# Patient Record
Sex: MALE | Race: AMERICAN INDIAN OR ALASKA NATIVE | Employment: OTHER | URBAN - METROPOLITAN AREA
[De-identification: names, ages, dates, MRNs, and addresses within clinical notes are randomized per-mention and may not be internally consistent; named-entity substitution may affect disease eponyms.]

---

## 2020-02-10 ENCOUNTER — OFFICE VISIT (OUTPATIENT)
Dept: SURGERY | Facility: CLINIC | Age: 35
End: 2020-02-10
Payer: COMMERCIAL

## 2020-02-10 VITALS
HEIGHT: 70.5 IN | WEIGHT: 174 LBS | BODY MASS INDEX: 24.63 KG/M2 | HEART RATE: 89 BPM | SYSTOLIC BLOOD PRESSURE: 126 MMHG | DIASTOLIC BLOOD PRESSURE: 90 MMHG

## 2020-02-10 DIAGNOSIS — N35.912 STRICTURE OF BULBOUS URETHRA IN MALE, UNSPECIFIED STRICTURE TYPE: Primary | ICD-10-CM

## 2020-02-10 PROCEDURE — 52281 CYSTOSCOPY AND TREATMENT: CPT | Performed by: UROLOGY

## 2020-02-10 PROCEDURE — 99212 OFFICE O/P EST SF 10 MIN: CPT | Performed by: NURSE PRACTITIONER

## 2020-02-10 PROCEDURE — 99203 OFFICE O/P NEW LOW 30 MIN: CPT | Performed by: NURSE PRACTITIONER

## 2020-02-10 RX ORDER — SULFAMETHOXAZOLE AND TRIMETHOPRIM 800; 160 MG/1; MG/1
1 TABLET ORAL 2 TIMES DAILY
Qty: 14 TABLET | Refills: 0 | Status: SHIPPED | OUTPATIENT
Start: 2020-02-10 | End: 2020-02-17

## 2020-02-10 NOTE — PROGRESS NOTES
HPI:    Patient ID: Paola Bertrand is a 37year old male. HPI     Patient is a 37year old male who presents to the clinic for a consult regarding difficulty with urination. No significant past medical history.      Patient states 1 week ago he wa Smoking status: Not on file    Alcohol use: Not on file    Drug use: Not on file       PHYSICAL EXAM:    Physical Exam   Constitutional: He is oriented to person, place, and time. He appears well-nourished. No distress. HENT:   Head: Normocephalic.    Ey being met in the bulbar urethra. I attempted to place a 12 Western Anny latex catheter using sterile technique again without success with resistance being met at the level of the bulbar urethra.     Patient was counseled regarding his management options which in up a consultation appointment as soon as possible with Dr. Padmini Akers at [facility] for further evaluation management of his recurrent bulbar urethral stricture. A 7-day course of Bactrim DS will be prescribed to his pharmacy on record.     All

## 2020-02-11 ENCOUNTER — TELEPHONE (OUTPATIENT)
Dept: SURGERY | Facility: CLINIC | Age: 35
End: 2020-02-11

## 2020-02-11 NOTE — TELEPHONE ENCOUNTER
Patient was seen on 2/10/2020 for this procedure in office. Called [facility] at [phone] and spoke with Julianne. No PA required for office based or outpatient cysto CPT 78331 on this plan.  Call reference Bernie Roy 2/11/2020

## 2020-02-11 NOTE — TELEPHONE ENCOUNTER
Pt has appt tomorrow at [facility] with Dr Claudette Burns- phone [phone] - asking for OV notes and any other info , results from yesterday to be faxed to [phone]

## 2020-11-11 NOTE — H&P
Dutch Alvarez is a 38year old male. HPI:   Patient presents with:  Establish Care: Former PCP Dr. Corbin Mcardle. Last ov over 2.5 years. Surgical/procedure Clearance: Has surgery scheduled for 12/7 for Urethraplasty at [facility].  Previously done in 19 Father    • Cancer Maternal Grandfather    • Diabetes Paternal Grandmother       Social History: Social History    Tobacco Use      Smoking status: Never Smoker      Smokeless tobacco: Never Used    Alcohol use: Yes      Frequency: 2-4 times a month      D No acute distress. Alert and oriented x 3. Eyes: EOMI, PERRLA, clear sclera b/l  HENT: NCAT, Moist mucous membranes, Oropharynx without erythema or exudates  Cardiovascular: S1, S2, no S3, no S4, Regular rate and rhythm, No murmurs/gallops/rubs.    Timothy Rincon baseline. He is actively trying to reduce his weight. Does not have any concerns for chest pain, shortness of breath, lower extremity swelling, dyspnea on exertion. Examination unremarkable as above.     He has an RCRI risk score of 0 corresponding to a indicated    11/12/2020  Agatha Cui MD    11/13/2020 Addendum:  Reviewed preoperative data:  - BMP: largely unremarkable - would recommend adequate hydration  - PT/INR, PTT WNL  - CBC WNL  - UA: mild protein, otherwise unremarkable  - UCx: negative    O

## 2020-11-12 ENCOUNTER — OFFICE VISIT (OUTPATIENT)
Dept: INTERNAL MEDICINE CLINIC | Facility: CLINIC | Age: 35
End: 2020-11-12
Payer: COMMERCIAL

## 2020-11-12 ENCOUNTER — LAB ENCOUNTER (OUTPATIENT)
Dept: LAB | Age: 35
End: 2020-11-12
Attending: INTERNAL MEDICINE
Payer: COMMERCIAL

## 2020-11-12 VITALS
OXYGEN SATURATION: 99 % | DIASTOLIC BLOOD PRESSURE: 80 MMHG | HEART RATE: 72 BPM | BODY MASS INDEX: 25.37 KG/M2 | WEIGHT: 177.19 LBS | HEIGHT: 70 IN | SYSTOLIC BLOOD PRESSURE: 122 MMHG | TEMPERATURE: 98 F

## 2020-11-12 DIAGNOSIS — Z13.0 SCREENING FOR DEFICIENCY ANEMIA: ICD-10-CM

## 2020-11-12 DIAGNOSIS — Z01.818 PREOPERATIVE CLEARANCE: ICD-10-CM

## 2020-11-12 DIAGNOSIS — Z00.00 WELLNESS EXAMINATION: ICD-10-CM

## 2020-11-12 DIAGNOSIS — N35.912 STRICTURE OF BULBOUS URETHRA IN MALE, UNSPECIFIED STRICTURE TYPE: ICD-10-CM

## 2020-11-12 DIAGNOSIS — Z00.00 WELLNESS EXAMINATION: Primary | ICD-10-CM

## 2020-11-12 PROCEDURE — 87086 URINE CULTURE/COLONY COUNT: CPT

## 2020-11-12 PROCEDURE — 3074F SYST BP LT 130 MM HG: CPT | Performed by: INTERNAL MEDICINE

## 2020-11-12 PROCEDURE — 85025 COMPLETE CBC W/AUTO DIFF WBC: CPT

## 2020-11-12 PROCEDURE — 3008F BODY MASS INDEX DOCD: CPT | Performed by: INTERNAL MEDICINE

## 2020-11-12 PROCEDURE — 85730 THROMBOPLASTIN TIME PARTIAL: CPT

## 2020-11-12 PROCEDURE — 36415 COLL VENOUS BLD VENIPUNCTURE: CPT

## 2020-11-12 PROCEDURE — 80048 BASIC METABOLIC PNL TOTAL CA: CPT

## 2020-11-12 PROCEDURE — 3079F DIAST BP 80-89 MM HG: CPT | Performed by: INTERNAL MEDICINE

## 2020-11-12 PROCEDURE — 99385 PREV VISIT NEW AGE 18-39: CPT | Performed by: INTERNAL MEDICINE

## 2020-11-12 PROCEDURE — 81001 URINALYSIS AUTO W/SCOPE: CPT

## 2020-11-12 PROCEDURE — 85610 PROTHROMBIN TIME: CPT

## 2020-11-12 NOTE — PATIENT INSTRUCTIONS
You were seen in clinic for clearance prior to surgery. Your most recent and no further testing is required for the heart. Overall your remain in excellent health and we will obtain the lab testing as recommended by Dr. Pantoja Burn office.   Based on these

## 2020-11-14 ENCOUNTER — TELEPHONE (OUTPATIENT)
Dept: INTERNAL MEDICINE CLINIC | Facility: CLINIC | Age: 35
End: 2020-11-14

## 2020-11-14 NOTE — TELEPHONE ENCOUNTER
Please kindly notify the patient that I have reviewed his preoperative blood work from 11/12/2020    BMP: May be a little bit dehydrated, but otherwise within normal limits  His coagulation factors (PT/INR, PTT): Normal  CBC: Blood counts are all normal, n

## 2020-11-16 NOTE — TELEPHONE ENCOUNTER
LMTCB-11/12 OV note and labs faxed to Dr. Savannah Wright at [phone], confirmation received. Original paperwork sent to scan.

## 2020-11-18 ENCOUNTER — TELEPHONE (OUTPATIENT)
Dept: INTERNAL MEDICINE CLINIC | Facility: CLINIC | Age: 35
End: 2020-11-18

## 2020-11-18 NOTE — TELEPHONE ENCOUNTER
Dr. Azeem Tran from [facility] called to get this pt's UA result. Please fax  It to him at [phone]. Dr. Azeem Tran can be reached at [phone]  Option 2.

## 2020-12-25 ENCOUNTER — HOSPITAL ENCOUNTER (EMERGENCY)
Facility: HOSPITAL | Age: 35
Discharge: HOME | End: 2020-12-25
Attending: EMERGENCY MEDICINE
Payer: COMMERCIAL

## 2020-12-25 VITALS
HEIGHT: 70 IN | WEIGHT: 175 LBS | SYSTOLIC BLOOD PRESSURE: 151 MMHG | HEART RATE: 100 BPM | BODY MASS INDEX: 25.05 KG/M2 | DIASTOLIC BLOOD PRESSURE: 86 MMHG | OXYGEN SATURATION: 100 % | TEMPERATURE: 97 F | RESPIRATION RATE: 18 BRPM

## 2020-12-25 DIAGNOSIS — T83.091A OBSTRUCTION OF FOLEY CATHETER, INITIAL ENCOUNTER (HCC): Primary | ICD-10-CM

## 2020-12-25 PROCEDURE — 99283 EMERGENCY DEPT VISIT LOW MDM: CPT

## 2020-12-25 NOTE — ED PROVIDER NOTES
Patient Seen in: [facility] AND [facility] Emergency Department      History   Patient presents with:  Cath Tube Problem    Stated Complaint: Cath issue    HPI  47-year-old male with history of urethral stricture status post urethroplasty on December 7 and place Constitutional:       Appearance: He is well-developed. HENT:      Head: Normocephalic and atraumatic. Neck:      Musculoskeletal: Normal range of motion. Cardiovascular:      Rate and Rhythm: Normal rate and regular rhythm.       Heart sounds: Norm

## 2020-12-25 NOTE — ED INITIAL ASSESSMENT (HPI)
Patient reports to ER reporting having a urethroplasty on December 7th at [address] [place] [address], sent home with a leg bag- which is supposed to come out on Tuesday, but states that it did not empty this morning. Denies pain. Very anxious in triage.

## 2021-04-20 ENCOUNTER — OFFICE VISIT (OUTPATIENT)
Dept: INTERNAL MEDICINE CLINIC | Facility: CLINIC | Age: 36
End: 2021-04-20
Payer: COMMERCIAL

## 2021-04-20 VITALS
OXYGEN SATURATION: 99 % | DIASTOLIC BLOOD PRESSURE: 74 MMHG | HEART RATE: 83 BPM | TEMPERATURE: 98 F | WEIGHT: 178.38 LBS | HEIGHT: 70 IN | BODY MASS INDEX: 25.54 KG/M2 | SYSTOLIC BLOOD PRESSURE: 110 MMHG

## 2021-04-20 DIAGNOSIS — M25.521 RIGHT ELBOW PAIN: ICD-10-CM

## 2021-04-20 DIAGNOSIS — M25.462 EFFUSION OF LEFT KNEE: Primary | ICD-10-CM

## 2021-04-20 PROCEDURE — 3078F DIAST BP <80 MM HG: CPT | Performed by: INTERNAL MEDICINE

## 2021-04-20 PROCEDURE — 99214 OFFICE O/P EST MOD 30 MIN: CPT | Performed by: INTERNAL MEDICINE

## 2021-04-20 PROCEDURE — 3074F SYST BP LT 130 MM HG: CPT | Performed by: INTERNAL MEDICINE

## 2021-04-20 PROCEDURE — 3008F BODY MASS INDEX DOCD: CPT | Performed by: INTERNAL MEDICINE

## 2021-04-20 RX ORDER — FLUTICASONE PROPIONATE 50 MCG
2 SPRAY, SUSPENSION (ML) NASAL DAILY PRN
COMMUNITY

## 2021-04-20 NOTE — PROGRESS NOTES
Marlena Moreno is a 38year old male. HPI:   He is here for joint pain.      3 weeks ago he was playing basketball and after that he had pain in the left knee that lasted for 4 days, 1 week ago he played basketball again and had pain in the right elb (36.8 °C)   Ht 5' 10\" (1.778 m)   Wt 178 lb 6.4 oz (80.9 kg)   SpO2 99%   BMI 25.60 kg/m²   GENERAL: well developed, well nourished,in no apparent distress  SKIN: no rashes,no suspicious lesions  HEENT: atraumatic, normocephalic,ears and throat are clear

## 2021-05-24 ENCOUNTER — OFFICE VISIT (OUTPATIENT)
Dept: INTERNAL MEDICINE CLINIC | Facility: CLINIC | Age: 36
End: 2021-05-24
Payer: COMMERCIAL

## 2021-05-24 VITALS
HEIGHT: 70 IN | SYSTOLIC BLOOD PRESSURE: 128 MMHG | OXYGEN SATURATION: 99 % | WEIGHT: 176.19 LBS | BODY MASS INDEX: 25.22 KG/M2 | HEART RATE: 66 BPM | DIASTOLIC BLOOD PRESSURE: 80 MMHG | TEMPERATURE: 98 F

## 2021-05-24 DIAGNOSIS — M25.521 RIGHT ELBOW PAIN: Primary | ICD-10-CM

## 2021-05-24 DIAGNOSIS — M25.462 EFFUSION OF LEFT KNEE: ICD-10-CM

## 2021-05-24 DIAGNOSIS — M70.21 OLECRANON BURSITIS OF RIGHT ELBOW: ICD-10-CM

## 2021-05-24 PROCEDURE — 99214 OFFICE O/P EST MOD 30 MIN: CPT | Performed by: INTERNAL MEDICINE

## 2021-05-24 PROCEDURE — 3079F DIAST BP 80-89 MM HG: CPT | Performed by: INTERNAL MEDICINE

## 2021-05-24 PROCEDURE — 3008F BODY MASS INDEX DOCD: CPT | Performed by: INTERNAL MEDICINE

## 2021-05-24 PROCEDURE — 3074F SYST BP LT 130 MM HG: CPT | Performed by: INTERNAL MEDICINE

## 2021-05-24 NOTE — PATIENT INSTRUCTIONS
You were seen in clinic for follow-up of your right elbow pain. This is likely due to olecranon bursitis as result of local trauma to that area.   Our first step of management is conservative therapy:    - Icing and compressing the area to facilitate impro

## 2021-05-24 NOTE — PROGRESS NOTES
Chief Complaint:   Patient presents with:  Checkup: right elbow pain, pain subsided, hit arm this week and noticed fluid in elbow. Iced today and yesterday.      HPI:     Mr. Yahir Solis is a 43year old male past medical history of urethral stricture requiring in Alcohol use: Yes      Comment: occasional    Drug use: Never    Family History:  Family History   Problem Relation Age of Onset   • Diabetes Father    • Cancer Maternal Grandfather    • Diabetes Paternal Grandmother      Allergies:    Seasonal Constitutional: No acute distress. Alert and oriented x 3.   Eyes: EOMI, PERRLA, clear sclera b/l  HENT: NCAT, Moist mucous membranes, Oropharynx without erythema or exudates  Neck: No JVD, no thyromegaly  Cardiovascular: S1, S2, no S3, no S4, Regular rat olecranon bursitis. –If needed, can consider aspiration in 2 to 3 weeks. In the acute setting, there is no indication to do so at this time as this is likely traumatic electron bursitis rather than septic joint or gout.   Patient has no restrictions at th

## 2021-09-06 ENCOUNTER — HOSPITAL ENCOUNTER (OUTPATIENT)
Age: 36
Discharge: HOME | End: 2021-09-06
Payer: COMMERCIAL

## 2021-09-06 VITALS
DIASTOLIC BLOOD PRESSURE: 93 MMHG | RESPIRATION RATE: 20 BRPM | WEIGHT: 170 LBS | HEIGHT: 70 IN | TEMPERATURE: 98 F | BODY MASS INDEX: 24.34 KG/M2 | OXYGEN SATURATION: 100 % | HEART RATE: 88 BPM | SYSTOLIC BLOOD PRESSURE: 124 MMHG

## 2021-09-06 DIAGNOSIS — T63.441A BEE STING, ACCIDENTAL OR UNINTENTIONAL, INITIAL ENCOUNTER: Primary | ICD-10-CM

## 2021-09-06 PROCEDURE — 99203 OFFICE O/P NEW LOW 30 MIN: CPT | Performed by: PHYSICIAN ASSISTANT

## 2021-09-06 NOTE — ED PROVIDER NOTES
Patient Seen in: [facility]      History   Patient presents with:  Bite Sting,Insect    Stated Complaint: insect bite    HPI/Subjective:   HPI    44 yo male here for evaluation of bee sting.   Pt states he was stung in the R posterior thigh ye is alert and oriented to person, place, and time.    Psychiatric:         Mood and Affect: Mood normal.         Behavior: Behavior normal.         ED Course   Labs Reviewed - No data to display      42 yo male here for evaluation of bee sting to the r poste

## 2022-11-15 ENCOUNTER — TELEPHONE (OUTPATIENT)
Dept: INTERNAL MEDICINE CLINIC | Facility: CLINIC | Age: 37
End: 2022-11-15

## 2022-11-15 NOTE — TELEPHONE ENCOUNTER
Patient is calling states he is going out of the country to [place] around 11/25/22. Patient is asking if there is anything medically he needs to do or take.     Please call and advise

## 2022-11-16 NOTE — TELEPHONE ENCOUNTER
Patient is calling and chose to leave a message on the [employer] answering machine. Patient states he called yesterday and is still waiting for a call back. Hoping for a call back today.       # [phone]

## 2022-11-16 NOTE — TELEPHONE ENCOUNTER
CDC recommends Staying up todate on  - Varicella (chickenpox) - OK if he had Chickenpox growing up or received childhood vaccinations  - Tdap - up to date  - Flu  - MMR - OK if had childhood vaccinations  - Polio- OK if had childhood vaccinations  - COVID  Recommended  - Hepatitis A (Not mandatory)  - Typhoid (only if staying in rural areas)    If there is insufficient time to obtain any of these vaccines, OK to maintain standard precautions (minimize bug bites, avoid/minimize sick contacts, monitoring for symptoms etc)

## 2022-11-29 ENCOUNTER — TELEPHONE (OUTPATIENT)
Dept: INTERNAL MEDICINE CLINIC | Facility: CLINIC | Age: 37
End: 2022-11-29

## 2022-11-29 NOTE — TELEPHONE ENCOUNTER
Received request from [address] [place] [address] for medical records as high priority. Faxed to scan stat as high priority.

## 2023-01-18 ENCOUNTER — OFFICE VISIT (OUTPATIENT)
Dept: INTERNAL MEDICINE CLINIC | Facility: CLINIC | Age: 38
End: 2023-01-18

## 2023-01-18 VITALS
SYSTOLIC BLOOD PRESSURE: 140 MMHG | HEART RATE: 76 BPM | WEIGHT: 179 LBS | HEIGHT: 70 IN | DIASTOLIC BLOOD PRESSURE: 80 MMHG | BODY MASS INDEX: 25.62 KG/M2 | OXYGEN SATURATION: 98 %

## 2023-01-18 DIAGNOSIS — R10.32 LLQ ABDOMINAL PAIN: Primary | ICD-10-CM

## 2023-01-18 PROCEDURE — 99213 OFFICE O/P EST LOW 20 MIN: CPT | Performed by: INTERNAL MEDICINE

## 2023-01-18 PROCEDURE — 3077F SYST BP >= 140 MM HG: CPT | Performed by: INTERNAL MEDICINE

## 2023-01-18 PROCEDURE — 3079F DIAST BP 80-89 MM HG: CPT | Performed by: INTERNAL MEDICINE

## 2023-01-18 PROCEDURE — 3008F BODY MASS INDEX DOCD: CPT | Performed by: INTERNAL MEDICINE

## 2023-01-18 RX ORDER — FINASTERIDE 1 MG/1
1 TABLET, FILM COATED ORAL DAILY
COMMUNITY
Start: 2022-08-19

## 2023-05-11 NOTE — PATIENT INSTRUCTIONS
- You were seen in clinic for regular annual check-up. We have ordered labs for you and we will call you with the results. Please obtain the bloodwork fasting for 12 hours. OK to drink water the day of your blood draw. We have the lab downstairs on the first floor. No appointment is necessary. Lab hours are Monday-Friday 7:30 AM to 4:45 PM.  There may be Saturday hours 7 AM-11:00 AM as well. Otherwise you can obtain the blood tests on the weekends at the main hospital or local immediate care/urgent care within the [facility]. -Today we will focus on management of difficulty with concentration and also weight management. We should rule out secondary causes such as thyroid disease. Please obtain fasting blood work    We did discuss the use of Adderall for difficulties with concentration standing even during grade school-middle school times. Thankfully, you have had great success despite the symptoms, however lets try to see if we get this under better control for you. We will start Adderall 10 mg once a day, and aim for the lowest effective dose of stimulant medication.  -Call or send a [employer] message 3 weeks after starting the medication. We want to ensure that we are on a good dosage with minimal side effects. We can consider a dose adjustment or switching to a different medication. We did talk about stimulant medication and We discussed the potential side effects of long-term stimulant use which include high blood pressure, chest pain, palpitations, unintended weight loss due to poor appetite, insomnia, increased anxiety. We should do periodic check ins to make sure that it is still safe to continue stimulant medication. The goal is to control symptoms while minimizing side effects with the lowest effective dose and the shortest duration of time. - In terms of weight management, remain in a healthy BMI level.   He may get added benefit from the appetite suppression from Adderall/stimulant therapy. This can result in weight loss, but monitor for excessive weight loss over time. You are doing well in terms of nutrition and exercise and should continue this as the primary foundation of weight management    - May have ongoing seasonal allergies. We do recommend:    - Trial of flonase 1 spray each nostril until symptoms improve. Use for at least 2 weeks even if symptoms improve  - Trial of antihistamine  Zyrtec/Allegra/Xyzal/Claritin once a day even if symptoms improve. You may need a baseline antihistamine regimen to control her symptoms. If you notice no improvement on 1 formulation, consider switching to another formulation.  - If still no improvement, may benefit from Singulair which is a medication we can consider after we are on a stable regimen of Adderall. In terms of your stomach symptoms, this may be related to a mild form of irritable bowel syndrome. You may have a degree of lactose intolerance. We recommended:  - Look for any other food triggers. A low FODMAP diet for the majority of the week may help improve your symptoms over time  - As we have some elements of constipation, consider using either a probiotic or stool softener (MiraLAX/Metamucil) once a day. This can help improve your bowel habits and may improve the abdominal symptoms you experience  - If no improvement, we can consider medications dedicated for IBS in the future.      -Please continue checking your blood pressures at home and notify us if they are increasing   - Please continue following up with urology, Dr. Kari Spears on an as-needed basis. - Please continue to eat a varied diet including recommended servings of vegetables, fruits, and low fat dairy. Minimize high saturated fats (such as fast foods) and high sugar intake (such as soda)  - We recommend 150 minutes of moderate intensity exercise (brisk walking, swimming) weekly to maintain your current weight.   Targeted weight loss will require more vigorous exercise or more than 150 minutes/week.     Return to clinic in 6 months for follow-up

## 2023-05-12 ENCOUNTER — OFFICE VISIT (OUTPATIENT)
Dept: INTERNAL MEDICINE CLINIC | Facility: CLINIC | Age: 38
End: 2023-05-12

## 2023-05-12 VITALS
HEART RATE: 79 BPM | OXYGEN SATURATION: 99 % | HEIGHT: 70 IN | SYSTOLIC BLOOD PRESSURE: 132 MMHG | DIASTOLIC BLOOD PRESSURE: 88 MMHG | TEMPERATURE: 98 F | BODY MASS INDEX: 24.82 KG/M2 | WEIGHT: 173.38 LBS

## 2023-05-12 DIAGNOSIS — Z00.00 ANNUAL PHYSICAL EXAM: Primary | ICD-10-CM

## 2023-05-12 DIAGNOSIS — Z13.0 SCREENING FOR DEFICIENCY ANEMIA: ICD-10-CM

## 2023-05-12 DIAGNOSIS — Z13.220 SCREENING FOR LIPOID DISORDERS: ICD-10-CM

## 2023-05-12 DIAGNOSIS — Z13.1 SCREENING FOR DIABETES MELLITUS: ICD-10-CM

## 2023-05-12 DIAGNOSIS — Z13.29 SCREENING FOR THYROID DISORDER: ICD-10-CM

## 2023-05-12 DIAGNOSIS — E55.9 VITAMIN D DEFICIENCY: ICD-10-CM

## 2023-05-12 DIAGNOSIS — R41.840 CONCENTRATION DEFICIT: ICD-10-CM

## 2023-05-12 PROCEDURE — 99395 PREV VISIT EST AGE 18-39: CPT | Performed by: INTERNAL MEDICINE

## 2023-05-12 PROCEDURE — 3079F DIAST BP 80-89 MM HG: CPT | Performed by: INTERNAL MEDICINE

## 2023-05-12 PROCEDURE — 3008F BODY MASS INDEX DOCD: CPT | Performed by: INTERNAL MEDICINE

## 2023-05-12 PROCEDURE — 3075F SYST BP GE 130 - 139MM HG: CPT | Performed by: INTERNAL MEDICINE

## 2023-05-12 RX ORDER — DEXTROAMPHETAMINE SACCHARATE, AMPHETAMINE ASPARTATE, DEXTROAMPHETAMINE SULFATE AND AMPHETAMINE SULFATE 2.5; 2.5; 2.5; 2.5 MG/1; MG/1; MG/1; MG/1
10 TABLET ORAL DAILY
Qty: 30 TABLET | Refills: 0 | Status: SHIPPED | OUTPATIENT
Start: 2023-05-12

## 2023-05-12 RX ORDER — FLUTICASONE PROPIONATE 50 MCG
1 SPRAY, SUSPENSION (ML) NASAL DAILY
Qty: 18.2 ML | Refills: 1 | Status: SHIPPED | OUTPATIENT
Start: 2023-05-12 | End: 2024-05-06

## 2023-05-30 ENCOUNTER — TELEPHONE (OUTPATIENT)
Dept: INTERNAL MEDICINE CLINIC | Facility: CLINIC | Age: 38
End: 2023-05-30

## 2023-05-30 RX ORDER — DEXTROAMPHETAMINE SACCHARATE, AMPHETAMINE ASPARTATE, DEXTROAMPHETAMINE SULFATE AND AMPHETAMINE SULFATE 2.5; 2.5; 2.5; 2.5 MG/1; MG/1; MG/1; MG/1
10 TABLET ORAL DAILY
Qty: 30 TABLET | Refills: 0 | Status: CANCELLED | OUTPATIENT
Start: 2023-05-30

## 2023-05-31 RX ORDER — DEXTROAMPHETAMINE SACCHARATE, AMPHETAMINE ASPARTATE, DEXTROAMPHETAMINE SULFATE AND AMPHETAMINE SULFATE 2.5; 2.5; 2.5; 2.5 MG/1; MG/1; MG/1; MG/1
10 TABLET ORAL DAILY
Qty: 30 TABLET | Refills: 0 | Status: SHIPPED | OUTPATIENT
Start: 2023-07-13 | End: 2023-08-12

## 2023-05-31 RX ORDER — FLUTICASONE PROPIONATE 50 MCG
1 SPRAY, SUSPENSION (ML) NASAL DAILY
Qty: 18.2 ML | Refills: 1 | Status: SHIPPED | OUTPATIENT
Start: 2023-05-31 | End: 2024-05-25

## 2023-05-31 RX ORDER — DEXTROAMPHETAMINE SACCHARATE, AMPHETAMINE ASPARTATE, DEXTROAMPHETAMINE SULFATE AND AMPHETAMINE SULFATE 2.5; 2.5; 2.5; 2.5 MG/1; MG/1; MG/1; MG/1
10 TABLET ORAL DAILY
Qty: 30 TABLET | Refills: 0 | Status: SHIPPED | OUTPATIENT
Start: 2023-06-12 | End: 2023-07-12

## 2023-05-31 RX ORDER — DEXTROAMPHETAMINE SACCHARATE, AMPHETAMINE ASPARTATE, DEXTROAMPHETAMINE SULFATE AND AMPHETAMINE SULFATE 2.5; 2.5; 2.5; 2.5 MG/1; MG/1; MG/1; MG/1
10 TABLET ORAL DAILY
Qty: 30 TABLET | Refills: 0 | Status: SHIPPED | OUTPATIENT
Start: 2023-08-13 | End: 2023-09-12

## 2023-05-31 NOTE — TELEPHONE ENCOUNTER
To MD:  The above refill request is for a controlled substance. Please review pended medication order. Print and sign for staff to fax to pharmacy or prescribe electronically.     Last office visit: 5/12/23    Last time refill sent and quantity/refills:     Dispensed Written Strength Quantity Refills Days Supply Provider Pharmacy   AMPHETAMINE SALT COMBO 05/12/2023 05/12/2023 10 mg 30 tablet  30 JOSE MIGUEL;[place];[id number]; [facility]

## 2023-06-11 ENCOUNTER — PATIENT MESSAGE (OUTPATIENT)
Dept: INTERNAL MEDICINE CLINIC | Facility: CLINIC | Age: 38
End: 2023-06-11

## 2023-06-30 ENCOUNTER — PATIENT MESSAGE (OUTPATIENT)
Dept: INTERNAL MEDICINE CLINIC | Facility: CLINIC | Age: 38
End: 2023-06-30

## 2023-06-30 NOTE — TELEPHONE ENCOUNTER
To DR. ROSALES - looks like patient still has refill on Adderall for August til September. and July through August.  Also has question about Ozempic

## 2023-07-10 ENCOUNTER — TELEPHONE (OUTPATIENT)
Dept: INTERNAL MEDICINE CLINIC | Facility: CLINIC | Age: 38
End: 2023-07-10

## 2023-07-10 RX ORDER — DEXTROAMPHETAMINE SACCHARATE, AMPHETAMINE ASPARTATE, DEXTROAMPHETAMINE SULFATE AND AMPHETAMINE SULFATE 2.5; 2.5; 2.5; 2.5 MG/1; MG/1; MG/1; MG/1
10 TABLET ORAL DAILY
Qty: 30 TABLET | Refills: 0 | Status: CANCELLED | OUTPATIENT
Start: 2023-07-10 | End: 2023-08-09

## 2023-07-11 NOTE — TELEPHONE ENCOUNTER
[facility] called to get permission to release the Adderall refill 1 day early. His  date is tomorrow, but Pt. Is going out of town tomorrow.

## 2023-07-11 NOTE — TELEPHONE ENCOUNTER
Spoke with pharmacist, Pedro Chris, who reports patient is going out of town tomorrow and would like to  adderall rx today. She confirmed medication was last dispensed 6/12/23, so today would only be one day early. She can take a verbal. Gave verbal OK to dispense Adderall Rx 1 day early as patient is going out of town.      FYI to Dr. Nciolas Samuel

## 2023-08-10 ENCOUNTER — TELEPHONE (OUTPATIENT)
Dept: INTERNAL MEDICINE CLINIC | Facility: CLINIC | Age: 38
End: 2023-08-10

## 2023-08-10 RX ORDER — DEXTROAMPHETAMINE SACCHARATE, AMPHETAMINE ASPARTATE, DEXTROAMPHETAMINE SULFATE AND AMPHETAMINE SULFATE 2.5; 2.5; 2.5; 2.5 MG/1; MG/1; MG/1; MG/1
10 TABLET ORAL DAILY
Qty: 30 TABLET | Refills: 0 | Status: SHIPPED | OUTPATIENT
Start: 2023-08-10 | End: 2023-09-09

## 2023-08-10 NOTE — TELEPHONE ENCOUNTER
TO Dr. Hermila Villagomez to please advise-- per [place] , last dispensed 7/11/23. August prescription dated 8/13/23. Spoke with [facility] who confirmed they would not let patient  until 8/12. amphetamine-dextroamphetamine (ADDERALL) 10 MG Oral Tab [Amilcar Praarias]      Patient Comment: Dr Hermila Villagomez. I dont think you are accounting for the 31 days. I take daily- this month i missed a day too. But youve noted that i cant refill until 12th. Each month i end up short a day.

## 2023-08-10 NOTE — TELEPHONE ENCOUNTER
[facility] is calling to request an early fill. There was 31 days in May and July so patient has run out of the medication and needs the refill today. Patient is one his last tablet today.       [facility] [place] [facility]

## 2023-09-11 RX ORDER — DEXTROAMPHETAMINE SACCHARATE, AMPHETAMINE ASPARTATE, DEXTROAMPHETAMINE SULFATE AND AMPHETAMINE SULFATE 2.5; 2.5; 2.5; 2.5 MG/1; MG/1; MG/1; MG/1
10 TABLET ORAL DAILY
Qty: 30 TABLET | Refills: 0 | Status: SHIPPED | OUTPATIENT
Start: 2023-11-12 | End: 2023-12-12

## 2023-09-11 RX ORDER — DEXTROAMPHETAMINE SACCHARATE, AMPHETAMINE ASPARTATE, DEXTROAMPHETAMINE SULFATE AND AMPHETAMINE SULFATE 2.5; 2.5; 2.5; 2.5 MG/1; MG/1; MG/1; MG/1
10 TABLET ORAL DAILY
Qty: 30 TABLET | Refills: 0 | Status: SHIPPED | OUTPATIENT
Start: 2023-10-12 | End: 2023-11-11

## 2023-09-11 RX ORDER — DEXTROAMPHETAMINE SACCHARATE, AMPHETAMINE ASPARTATE, DEXTROAMPHETAMINE SULFATE AND AMPHETAMINE SULFATE 2.5; 2.5; 2.5; 2.5 MG/1; MG/1; MG/1; MG/1
10 TABLET ORAL DAILY
Qty: 30 TABLET | Refills: 0 | Status: SHIPPED | OUTPATIENT
Start: 2023-09-11 | End: 2023-10-11

## 2023-09-11 NOTE — TELEPHONE ENCOUNTER
To MD:  The above refill request is for a controlled substance. Please review pended medication order. Print and sign for staff to fax to pharmacy or prescribe electronically.     Last office visit:  Last time refill sent and quantity/refills:

## 2023-09-11 NOTE — TELEPHONE ENCOUNTER
Pt. Called back following up  on refill request.  He is leaving for out of town tomorrow and is asking to get the refill today.

## 2023-09-11 NOTE — TELEPHONE ENCOUNTER
Patient is calling to request a refill   Adderall 10 mg    Patient has been out of the medication since 9/9    [facility]    Please call patient with a status [phone]

## 2023-10-02 NOTE — PATIENT INSTRUCTIONS
You were seen in clinic today for evaluation of allergic rhinitis/hayfever. Due to ongoing symptoms, we should escalate your regimen further. We recommended:    -Medrol Dosepak for current flareup  - Zyrtec or Xyzal (as an alternative) once a day  - Use Flonase 1 spray each nostril as needed  - We will start Singulair once a day. May take a few weeks to take full effect. Notify us if no improvement by 2-3 weeks. - If you have improvement with Singulair, we could consider coming off of the Flonase and antihistamine. For now, lets see how you do with the combination therapy  - If needed, we could consider allergy evaluation with Dr. Jhon Sarabia. For snoring, lets see if things improve with the allergic symptoms first.  If no improvement, then we can consider ENT evaluation    For the knee pain, likely soft tissue injury as your knee exam was reassuring today. - No need for immediate imaging at this time unless symptoms worsen or no further Seen in the next 1-2 weeks.    -Would recommend initial trial of conservative therapy:    -Bracing/icing the area, use of compression sleeve as you have been doing, elevation at nighttime  -Acetaminophen 500-650 mg every 4-6 hours as needed for pain relief  -Ibuprofen 200-400 mg every 8 hours as needed for anti-inflammatory and pain relief  -For more severe pain, notify us as we can consider alternative medication  -Trial of physical therapy can be considered if home stretches and exercises are insufficient    -Advised to notify clinic if still no improvement in 4 to 6 weeks. For refills of Adderall, notify us 5 days in advance when we are getting low on the medication. Your next refill should be due 10/12/2023 and should be ready at the pharmacy. Return to clinic on as-needed basis.

## 2023-10-05 ENCOUNTER — OFFICE VISIT (OUTPATIENT)
Dept: INTERNAL MEDICINE CLINIC | Facility: CLINIC | Age: 38
End: 2023-10-05

## 2023-10-05 VITALS
OXYGEN SATURATION: 99 % | HEART RATE: 70 BPM | BODY MASS INDEX: 25.05 KG/M2 | HEIGHT: 70 IN | DIASTOLIC BLOOD PRESSURE: 84 MMHG | SYSTOLIC BLOOD PRESSURE: 122 MMHG | RESPIRATION RATE: 24 BRPM | TEMPERATURE: 98 F | WEIGHT: 175 LBS

## 2023-10-05 DIAGNOSIS — M25.562 ACUTE PAIN OF LEFT KNEE: ICD-10-CM

## 2023-10-05 DIAGNOSIS — J30.9 CHRONIC ALLERGIC RHINITIS: Primary | ICD-10-CM

## 2023-10-05 DIAGNOSIS — R41.840 CONCENTRATION DEFICIT: ICD-10-CM

## 2023-10-05 PROCEDURE — 99214 OFFICE O/P EST MOD 30 MIN: CPT | Performed by: INTERNAL MEDICINE

## 2023-10-05 PROCEDURE — 3079F DIAST BP 80-89 MM HG: CPT | Performed by: INTERNAL MEDICINE

## 2023-10-05 PROCEDURE — 3008F BODY MASS INDEX DOCD: CPT | Performed by: INTERNAL MEDICINE

## 2023-10-05 PROCEDURE — 3074F SYST BP LT 130 MM HG: CPT | Performed by: INTERNAL MEDICINE

## 2023-10-05 RX ORDER — MONTELUKAST SODIUM 10 MG/1
10 TABLET ORAL NIGHTLY
Qty: 90 TABLET | Refills: 3 | Status: SHIPPED | OUTPATIENT
Start: 2023-10-05

## 2023-12-14 ENCOUNTER — PATIENT MESSAGE (OUTPATIENT)
Dept: INTERNAL MEDICINE CLINIC | Facility: CLINIC | Age: 38
End: 2023-12-14

## 2023-12-14 ENCOUNTER — TELEPHONE (OUTPATIENT)
Dept: INTERNAL MEDICINE CLINIC | Facility: CLINIC | Age: 38
End: 2023-12-14

## 2023-12-15 RX ORDER — DEXTROAMPHETAMINE SACCHARATE, AMPHETAMINE ASPARTATE, DEXTROAMPHETAMINE SULFATE AND AMPHETAMINE SULFATE 2.5; 2.5; 2.5; 2.5 MG/1; MG/1; MG/1; MG/1
10 TABLET ORAL DAILY
Qty: 30 TABLET | Refills: 0 | Status: SHIPPED | OUTPATIENT
Start: 2024-01-15 | End: 2023-12-18

## 2023-12-15 RX ORDER — DEXTROAMPHETAMINE SACCHARATE, AMPHETAMINE ASPARTATE, DEXTROAMPHETAMINE SULFATE AND AMPHETAMINE SULFATE 2.5; 2.5; 2.5; 2.5 MG/1; MG/1; MG/1; MG/1
10 TABLET ORAL DAILY
Qty: 30 TABLET | Refills: 0 | Status: SHIPPED | OUTPATIENT
Start: 2023-12-15 | End: 2023-12-18

## 2023-12-15 RX ORDER — DEXTROAMPHETAMINE SACCHARATE, AMPHETAMINE ASPARTATE, DEXTROAMPHETAMINE SULFATE AND AMPHETAMINE SULFATE 2.5; 2.5; 2.5; 2.5 MG/1; MG/1; MG/1; MG/1
10 TABLET ORAL DAILY
Qty: 30 TABLET | Refills: 0 | Status: SHIPPED | OUTPATIENT
Start: 2024-02-15 | End: 2023-12-18

## 2023-12-15 NOTE — TELEPHONE ENCOUNTER
Please notify the patient that we refilled his medications as requested.   Should have 3 available refills over the next 3 months

## 2023-12-15 NOTE — TELEPHONE ENCOUNTER
Pt. Called following up on refill request for Adderall. He took his last one today and is now out of meds. Please send to [facility] [employer].

## 2023-12-15 NOTE — TELEPHONE ENCOUNTER
To MD:  The above refill request is for a controlled substance. Please review pended medication order. Print and sign for staff to fax to pharmacy or prescribe electronically. Last office visit:10/523  Last time refill sent and quantity/refills:11/12/23 # 30  To DR. ROSALES

## 2023-12-18 ENCOUNTER — HOSPITAL ENCOUNTER (EMERGENCY)
Facility: HOSPITAL | Age: 38
Discharge: HOME | End: 2023-12-18
Payer: COMMERCIAL

## 2023-12-18 ENCOUNTER — APPOINTMENT (OUTPATIENT)
Dept: GENERAL RADIOLOGY | Facility: HOSPITAL | Age: 38
End: 2023-12-18
Payer: COMMERCIAL

## 2023-12-18 VITALS
HEIGHT: 70.5 IN | BODY MASS INDEX: 24.07 KG/M2 | TEMPERATURE: 98 F | OXYGEN SATURATION: 100 % | SYSTOLIC BLOOD PRESSURE: 132 MMHG | DIASTOLIC BLOOD PRESSURE: 79 MMHG | WEIGHT: 170 LBS | HEART RATE: 98 BPM | RESPIRATION RATE: 21 BRPM

## 2023-12-18 DIAGNOSIS — Q68.2 PATELLA ALTA: ICD-10-CM

## 2023-12-18 DIAGNOSIS — S86.811A RUPTURE OF RIGHT PATELLAR TENDON, INITIAL ENCOUNTER: Primary | ICD-10-CM

## 2023-12-18 PROCEDURE — 99283 EMERGENCY DEPT VISIT LOW MDM: CPT

## 2023-12-18 PROCEDURE — 73560 X-RAY EXAM OF KNEE 1 OR 2: CPT

## 2023-12-18 PROCEDURE — 99284 EMERGENCY DEPT VISIT MOD MDM: CPT

## 2023-12-18 RX ORDER — DEXTROAMPHETAMINE SACCHARATE, AMPHETAMINE ASPARTATE, DEXTROAMPHETAMINE SULFATE AND AMPHETAMINE SULFATE 2.5; 2.5; 2.5; 2.5 MG/1; MG/1; MG/1; MG/1
10 TABLET ORAL DAILY
Qty: 30 TABLET | Refills: 0 | Status: SHIPPED | OUTPATIENT
Start: 2023-12-18 | End: 2024-01-17

## 2023-12-18 RX ORDER — DEXTROAMPHETAMINE SACCHARATE, AMPHETAMINE ASPARTATE, DEXTROAMPHETAMINE SULFATE AND AMPHETAMINE SULFATE 2.5; 2.5; 2.5; 2.5 MG/1; MG/1; MG/1; MG/1
10 TABLET ORAL DAILY
Qty: 30 TABLET | Refills: 0 | Status: SHIPPED | OUTPATIENT
Start: 2024-01-18 | End: 2024-02-17

## 2023-12-18 RX ORDER — DEXTROAMPHETAMINE SACCHARATE, AMPHETAMINE ASPARTATE, DEXTROAMPHETAMINE SULFATE AND AMPHETAMINE SULFATE 2.5; 2.5; 2.5; 2.5 MG/1; MG/1; MG/1; MG/1
10 TABLET ORAL DAILY
Qty: 30 TABLET | Refills: 0 | Status: SHIPPED | OUTPATIENT
Start: 2024-02-18 | End: 2024-03-19

## 2023-12-18 NOTE — TELEPHONE ENCOUNTER
Patient is calling [facility] doesn't have Adderall in stock    Patient spoke to [facility] they have the medication in stock   please transfer to the script to [address] [place] [address]  Patient has been without the medication for 2 days    Please call when complete [phone]

## 2023-12-19 NOTE — ED INITIAL ASSESSMENT (HPI)
Pt to ed w/c of right knee injury. Per pt, he was playing basketball when he hit knee to knee against another player. Swelling and deformity to right knee noted. Pt reports pain 9/10.

## 2023-12-19 NOTE — ED QUICK NOTES
Discharge instructions given to pt. Pt verbalized understanding of home care, and to follow up with ortho referral provided. Pt denied further questions or concerns. Pt ambulatory with crutches, pt discharged in stable condition.

## 2023-12-30 ENCOUNTER — TELEPHONE (OUTPATIENT)
Dept: INTERNAL MEDICINE CLINIC | Facility: CLINIC | Age: 38
End: 2023-12-30

## 2023-12-30 NOTE — TELEPHONE ENCOUNTER
Received outside hospital records from Dr. Russell of [facility] at [facility]  - Right knee acute patellar tendon rupture, planning for MRI will undergo progressive rehab with leg locked in extension for 6 week in the postoperative setting.  Planning for surgical repair

## 2024-03-31 RX ORDER — FINASTERIDE 1 MG/1
1 TABLET, FILM COATED ORAL DAILY
Refills: 0 | Status: CANCELLED | OUTPATIENT
Start: 2024-03-31

## 2024-04-01 ENCOUNTER — TELEPHONE (OUTPATIENT)
Dept: INTERNAL MEDICINE CLINIC | Facility: CLINIC | Age: 39
End: 2024-04-01

## 2024-04-01 DIAGNOSIS — R41.840 CONCENTRATION DEFICIT: Primary | ICD-10-CM

## 2024-04-01 RX ORDER — DEXTROAMPHETAMINE SACCHARATE, AMPHETAMINE ASPARTATE, DEXTROAMPHETAMINE SULFATE AND AMPHETAMINE SULFATE 2.5; 2.5; 2.5; 2.5 MG/1; MG/1; MG/1; MG/1
10 TABLET ORAL DAILY
Qty: 30 TABLET | Refills: 0 | Status: SHIPPED | OUTPATIENT
Start: 2024-05-02 | End: 2024-06-01

## 2024-04-01 RX ORDER — DEXTROAMPHETAMINE SACCHARATE, AMPHETAMINE ASPARTATE, DEXTROAMPHETAMINE SULFATE AND AMPHETAMINE SULFATE 2.5; 2.5; 2.5; 2.5 MG/1; MG/1; MG/1; MG/1
10 TABLET ORAL DAILY
Qty: 30 TABLET | Refills: 0 | Status: SHIPPED | OUTPATIENT
Start: 2024-06-02 | End: 2024-04-01

## 2024-04-01 RX ORDER — DEXTROAMPHETAMINE SACCHARATE, AMPHETAMINE ASPARTATE, DEXTROAMPHETAMINE SULFATE AND AMPHETAMINE SULFATE 2.5; 2.5; 2.5; 2.5 MG/1; MG/1; MG/1; MG/1
10 TABLET ORAL DAILY
Qty: 30 TABLET | Refills: 0 | Status: CANCELLED
Start: 2024-06-02 | End: 2024-07-02

## 2024-04-01 RX ORDER — DEXTROAMPHETAMINE SACCHARATE, AMPHETAMINE ASPARTATE, DEXTROAMPHETAMINE SULFATE AND AMPHETAMINE SULFATE 2.5; 2.5; 2.5; 2.5 MG/1; MG/1; MG/1; MG/1
10 TABLET ORAL DAILY
Qty: 30 TABLET | Refills: 0 | Status: SHIPPED | OUTPATIENT
Start: 2024-06-02 | End: 2024-07-02

## 2024-04-01 RX ORDER — DEXTROAMPHETAMINE SACCHARATE, AMPHETAMINE ASPARTATE, DEXTROAMPHETAMINE SULFATE AND AMPHETAMINE SULFATE 2.5; 2.5; 2.5; 2.5 MG/1; MG/1; MG/1; MG/1
10 TABLET ORAL DAILY
Qty: 30 TABLET | Refills: 0 | Status: CANCELLED
Start: 2024-05-02 | End: 2024-06-01

## 2024-04-01 RX ORDER — DEXTROAMPHETAMINE SACCHARATE, AMPHETAMINE ASPARTATE, DEXTROAMPHETAMINE SULFATE AND AMPHETAMINE SULFATE 2.5; 2.5; 2.5; 2.5 MG/1; MG/1; MG/1; MG/1
10 TABLET ORAL DAILY
Qty: 30 TABLET | Refills: 0 | Status: SHIPPED | OUTPATIENT
Start: 2024-04-01 | End: 2024-05-01

## 2024-04-01 RX ORDER — DEXTROAMPHETAMINE SACCHARATE, AMPHETAMINE ASPARTATE, DEXTROAMPHETAMINE SULFATE AND AMPHETAMINE SULFATE 2.5; 2.5; 2.5; 2.5 MG/1; MG/1; MG/1; MG/1
10 TABLET ORAL DAILY
Qty: 30 TABLET | Refills: 0 | Status: CANCELLED
Start: 2024-04-01 | End: 2024-05-01

## 2024-04-01 RX ORDER — DEXTROAMPHETAMINE SACCHARATE, AMPHETAMINE ASPARTATE, DEXTROAMPHETAMINE SULFATE AND AMPHETAMINE SULFATE 2.5; 2.5; 2.5; 2.5 MG/1; MG/1; MG/1; MG/1
10 TABLET ORAL DAILY
Qty: 30 TABLET | Refills: 0 | Status: SHIPPED | OUTPATIENT
Start: 2024-04-01 | End: 2024-04-01

## 2024-04-01 RX ORDER — DEXTROAMPHETAMINE SACCHARATE, AMPHETAMINE ASPARTATE, DEXTROAMPHETAMINE SULFATE AND AMPHETAMINE SULFATE 2.5; 2.5; 2.5; 2.5 MG/1; MG/1; MG/1; MG/1
10 TABLET ORAL DAILY
Qty: 30 TABLET | Refills: 0 | Status: SHIPPED | OUTPATIENT
Start: 2024-05-02 | End: 2024-04-01

## 2024-04-01 NOTE — TELEPHONE ENCOUNTER
To Dr. ROSALES-    The above refill request is for a controlled substance.  Please review pended medication order.  LOV: 10/5  Prescribed: 30 2/18/24  : 2/28 30

## 2024-04-01 NOTE — TELEPHONE ENCOUNTER
Please notify patient that I sent the refills and notify us if there is any issues with the prescription

## 2024-04-01 NOTE — TELEPHONE ENCOUNTER
To MD:  The above refill request is for a controlled substance.  Please review pended medication order.   Print and sign for staff to fax to pharmacy or prescribe electronically.    Last office visit:10/05/23  Last time refill sent and quantity/refills:2/18/24  # 30   To

## 2024-04-01 NOTE — TELEPHONE ENCOUNTER
From: Matthew Vera  Sent: 3/31/2024 8:10 PM CDT  To: [facility] Clinical Staff  Subject: Adderall refill    Hi! I have just one tablet left for tomorrow. Can you reset me for 3 months? I’m traveling Tues so would like to  tomorrow if possible! Thanks!

## 2024-04-01 NOTE — TELEPHONE ENCOUNTER
Not prescribed by our office previously     Current refill request refused due to refill is either a duplicate request or has active refills at the pharmacy.  Check previous templates.    Requested Prescriptions     Pending Prescriptions Disp Refills    finasteride 1 MG Oral Tab  0     Sig: Take 1 tablet (1 mg total) by mouth daily.

## 2024-05-02 ENCOUNTER — PATIENT MESSAGE (OUTPATIENT)
Dept: INTERNAL MEDICINE CLINIC | Facility: CLINIC | Age: 39
End: 2024-05-02

## 2024-05-02 NOTE — TELEPHONE ENCOUNTER
From: Matthew Vera  To: Amilcar Nuñez  Sent: 5/2/2024 1:57 PM CDT  Subject: Question for my wife     Hi Dr. Nuñez,    Wife looking for a new primary care physician and I suggested you for her. Can you please consider taking her on?     Thanks so much!    -Matthew

## 2024-09-09 DIAGNOSIS — F90.9 ATTENTION DEFICIT HYPERACTIVITY DISORDER (ADHD), UNSPECIFIED ADHD TYPE: ICD-10-CM

## 2024-09-09 RX ORDER — DEXTROAMPHETAMINE SACCHARATE, AMPHETAMINE ASPARTATE, DEXTROAMPHETAMINE SULFATE AND AMPHETAMINE SULFATE 2.5; 2.5; 2.5; 2.5 MG/1; MG/1; MG/1; MG/1
10 TABLET ORAL DAILY
Qty: 30 TABLET | Refills: 0 | Status: CANCELLED | OUTPATIENT
Start: 2024-09-09 | End: 2024-10-09

## 2024-09-09 NOTE — TELEPHONE ENCOUNTER
Pt hasa refill 9/3 available     Current refill request refused due to refill is either a duplicate request or has active refills at the pharmacy.  Check previous templates.    Requested Prescriptions     Pending Prescriptions Disp Refills    amphetamine-dextroamphetamine (ADDERALL) 10 MG Oral Tab 30 tablet 0     Sig: Take 1 tablet (10 mg total) by mouth daily.

## 2024-11-19 ENCOUNTER — TELEPHONE (OUTPATIENT)
Dept: INTERNAL MEDICINE CLINIC | Facility: CLINIC | Age: 39
End: 2024-11-19

## 2025-01-27 ENCOUNTER — PATIENT MESSAGE (OUTPATIENT)
Dept: INTERNAL MEDICINE CLINIC | Facility: CLINIC | Age: 40
End: 2025-01-27

## 2025-01-27 DIAGNOSIS — R41.840 CONCENTRATION DEFICIT: Primary | ICD-10-CM

## 2025-01-28 RX ORDER — DEXTROAMPHETAMINE SACCHARATE, AMPHETAMINE ASPARTATE, DEXTROAMPHETAMINE SULFATE AND AMPHETAMINE SULFATE 2.5; 2.5; 2.5; 2.5 MG/1; MG/1; MG/1; MG/1
10 TABLET ORAL DAILY
Qty: 30 TABLET | Refills: 0 | Status: SHIPPED | OUTPATIENT
Start: 2025-02-28 | End: 2025-03-30

## 2025-01-28 RX ORDER — DEXTROAMPHETAMINE SACCHARATE, AMPHETAMINE ASPARTATE, DEXTROAMPHETAMINE SULFATE AND AMPHETAMINE SULFATE 2.5; 2.5; 2.5; 2.5 MG/1; MG/1; MG/1; MG/1
10 TABLET ORAL DAILY
Qty: 30 TABLET | Refills: 0 | Status: SHIPPED | OUTPATIENT
Start: 2025-01-28 | End: 2025-02-27

## 2025-01-28 RX ORDER — DEXTROAMPHETAMINE SACCHARATE, AMPHETAMINE ASPARTATE, DEXTROAMPHETAMINE SULFATE AND AMPHETAMINE SULFATE 2.5; 2.5; 2.5; 2.5 MG/1; MG/1; MG/1; MG/1
10 TABLET ORAL DAILY
Qty: 30 TABLET | Refills: 0 | Status: SHIPPED | OUTPATIENT
Start: 2025-03-31 | End: 2025-04-30

## 2025-01-28 NOTE — TELEPHONE ENCOUNTER
Pt. Called following up on refill request.  He is leaving town tomorrow and needs to pick it up today.  He also states he does have an appt. Scheduled with Dr. Nuñez on 2/7/25.

## 2025-01-28 NOTE — TELEPHONE ENCOUNTER
Please notify patient that we sent the refill, notify us if there is any issues with the prescription

## 2025-01-28 NOTE — TELEPHONE ENCOUNTER
To MD:  The above refill request is for a controlled substance.  Please review pended medication order.   Print and sign for staff to fax to pharmacy or prescribe electronically.    Last office visit: 10/5/23  Last time refill sent and quantity/refills: 10/11-12/12 90 day panel    Medication Dispense History (from 8/1/2024 to 1/27/2025)  Expand All  Collapse All  Amphetamine-Dextroamphetamine     Dispensed Written Strength Quantity Refills Days Supply Provider Pharmacy   AMPHETAMINE SALT COMBO 12/27/2024 10/11/2024 2.5 / 2.5 / 2.5 / 2.5 30  30 JOSE MIGUEL;DARLEEN;[place];[id number]; [facility]   AMPHETAMINE SALT COMBO 11/19/2024 10/11/2024 2.5 / 2.5 / 2.5 / 2.5 30  30 SOPHIA;[place];[id number]; [facility]   AMPHETAMINE SALT COMBO 10/11/2024 10/11/2024 2.5 / 2.5 / 2.5 / 2.5 30  30 S[place];[id number]; [facility]

## 2025-02-05 NOTE — PATIENT INSTRUCTIONS
- You were seen in clinic for regular annual check-up. We have ordered labs for you and we will call you with the results. Please obtain the bloodwork fasting for 10**12 hours. OK to drink water the day of your blood draw.      We have the lab downstairs on the first floor.  No appointment is necessary.  Lab hours are Monday-Friday 7:30 AM to 4:45 PM.  There may be Saturday hours 7 AM-11:00 AM as well.     Otherwise you can obtain the blood tests on the weekends at the main hospital or local immediate care/urgent care within the [facility].    -Happy to hear that the Adderall medication has been helping.  -Let us finish the current prescription.  At the next refill, we will increase to the 20 mg.  -Will try to stay up-to-date with the refills schedule as this being a controlled substance, we are limited to individual 1 month supplies 3 months at a time.  The third month sometimes the medication can fall off the system, notify us as soon as possible if there is an issue with refills  -We discussed the potential side effects of long-term stimulant use which include high blood pressure, chest pain, palpitations, unintended weight loss due to poor appetite, insomnia, increased anxiety.  We should do periodic check ins to make sure that it is still safe to continue stimulant medication.  The goal is to control symptoms while minimizing side effects with the lowest effective dose and the shortest duration of time.  Monitor for any changes of    -From the knee pain, you are doing well with proceeding with exercises, stretches that prevent the stiffness of the right knee.  Continue with athletics and exercise as tolerated.    -Seasonal allergies, runny nose, postnasal drip we will continue Flonase or Afrin, antihistamine daily as needed  - Colonoscopies will start age 43  - Continue following with urology as needed  -Vaccines may be due for: COVID dose #5   - Please continue to eat a varied diet including  recommended servings of vegetables, fruits, and low fat dairy. Minimize high saturated fats (such as fast foods) and high sugar intake (such as soda)  - We recommend 150 minutes of moderate intensity exercise (brisk walking, swimming) weekly to maintain your current weight.  Targeted weight loss will require more vigorous exercise or more than 150 minutes/week.    Return to clinic in 6-12 months for follow-up

## 2025-02-05 NOTE — H&P
Chief Complaint:   Chief Complaint   Patient presents with    Checkup     HPI:     Mr. Vera is a 39 year old male past medical history of urethral stricture requiring intervention who presents today for Annual physical examination.    Overall doing well. No pain.    His knee pain is better. Snowboarding went well. Basketball.     Runny nose is much less - Nov Dec. Dry nose, afrin as needed.    Work-life balance. Concentration and focus better with adderall. Sometimes takes a day or two.     No issues with Bm or urination. 3-6 months has blood in the BM. Can occur with constipation. BM 1-2x.     I reviewed and updated the PMHx, FamHx, medications, allergies, and SocHx as below with the patient    SocHX  - Home: wife and 2 kids  - Work:    - Sexual Activity: yes  - Hobbies: basketball x 1 week, eating; board games  - Nutrition: veggies and fruits, burgers, sandwiches, cheese. Water. Not too much   - Physical Activity: Treadmill - 3-4 days a week, more work out routines; started more in the January.         Past Medical History:    Stricture of male urethra     Past Surgical History:   Procedure Laterality Date    Appendectomy      Age 21    Knee surgery      Age 17    Other surgical history  2014    Urethraplasty    Other surgical history  2016    DVIU    Other surgical history  12/2020    Urethraplasty    Repair of nasal septum  2018     Social History:  Social History     Socioeconomic History    Marital status:    Tobacco Use    Smoking status: Never     Passive exposure: Never    Smokeless tobacco: Never   Vaping Use    Vaping status: Never Used   Substance and Sexual Activity    Alcohol use: Yes     Comment: occasional    Drug use: Never     Family History:  Family History   Problem Relation Age of Onset    Diabetes Father     Cancer Maternal Grandfather     Diabetes Paternal Grandmother      Allergies:  Allergies   Allergen Reactions    Seasonal OTHER (SEE COMMENTS)     Sneezing, cough      Current Meds:  Current Outpatient Medications   Medication Sig Dispense Refill    amphetamine-dextroamphetamine (ADDERALL) 10 MG Oral Tab Take 1 tablet (10 mg total) by mouth daily. 30 tablet 0      Counseling given: Not Answered       REVIEW OF SYSTEMS:   Positive Findings indicated in BOLD    Constitutional: Fever, Chills, Weight Gain, Weight Loss, Night Sweats, Fatigue, Malaise  ENT/Mouth:  Hearing Changes, Ear Pain, Nasal Congestion, Sinus Pain, Hoarseness, Sore throat, Rhinorrhea, Swallowing Difficulty  Eyes: Eye Pain, Swelling, Redness, Foreign Body, Discharge, Vision Changes  Cardiovascular: Chest Pain, SOB, PND, Dyspnea on Exertion, Orthopnea, Claudication, Edema, Palpitations  Respiratory: Cough, Sputum, Wheezing, Shortness of breath  Gastrointestinal: Nausea, Vomiting, Diarrhea, Constipation, Pain, Heartburn, Dysphagia, Bloody stools, Tarry stools  Genitourinary: Dysmenorrhea, Dysuria, Urinary Frequency, Hematuria, Urinary Incontinence, Urgency,  Flank Pain  Musculoskeletal: Arthralgias, Myalgias, Joint Swelling, Joint Stiffness, Back Pain, Neck Pain  Integumentary: Skin Lesions, Pruritis, Hair Changes, Jaundice, Nail changes  Neuro: Weakness, Numbness, Paresthesias, Loss of Consciousness, Syncope, Dizziness, Headache, Falls  Psych: Anxiety, Depression, Insomnia, Suicidal Ideation, Homicidal ideation, Memory Changes; Difficulty with concentration  Heme/Lymph: Bruising, Bleeding, Lymphadenopathy  Endocrine: Polyuria, Polydipsia, Temperature Intolerance    EXAM:   Vital Signs:  Blood pressure 112/62, pulse 76, temperature 97.4 °F (36.3 °C), temperature source Oral, height 5' 10.5\" (1.791 m), weight 169 lb (76.7 kg).     Constitutional: No acute distress. Alert and oriented x 3.  Eyes: EOMI, PERRLA, clear sclera b/l  HENT: NCAT, Moist mucous membranes, Oropharynx without erythema or exudates  Neck: No JVD, no thyromegaly  Cardiovascular: S1, S2, no S3, no S4, Regular rate and rhythm, No  murmurs/gallops/rubs.   Vascular: Equal pulses 2+ radial bilaterally  Respiratory: Clear to auscultation bilaterally.  No wheezes/rales/rhonchi  Gastrointestinal: Soft, nontender, nondistended. Positive bowel sounds x 4. No rebound tenderness. No hepatomegaly, No splenomegaly  Genitourinary: No CVA tenderness bilaterally  Neurologic: No focal neurological deficits, CN II-XII intact, light touch intact, MSK Strength 5/5 and symmetric in all extremities, normal gait  Musculoskeletal:  otherwise has full active range of motion of bilateral upper extremities.  Skin: No lesions, No erythema, no jaundice, Cap Refill < 2s  Psychiatric: Appropriate mood and affect      DATA REVIEWED   Labs:  No results found for this or any previous visit (from the past 8760 hours).    No results found for this or any previous visit (from the past 8760 hours).      Pathology:  Urethroplasty 12/8/2020 pathology  URETHRAL STRICTURE, URETHROPLASTY:   -SQUAMOUS MUCOSA WITH MILD CHRONIC INFLAMMATION     ASSESSMENT AND PLAN:     Chronic rhinitis  Likely as result of seasonal allergies.  History of deviated septum surgery  - Overall improved, out of season.  More so November-December.  Uses Afrin nasal spray as needed     R knee pain  -Recall knee injury previously that limits high intensity exercise.  Has had 2 step playing basketball.  Skiing cause some stiffness.  Able to snowboard and perform other usual activities with exercise and stretches  - Avoiding provocative measures.  Not requiring any medications for pain relief at this time        Difficulty with concentration  May have had symptoms concerning for ADHD and middle school-grade school.  Symptoms of poor concentration at times, difficulty with completing tasks, easy distractibility..  Along with associated symptom of difficulty with losing weight.  No anxiety depression  -Secondary work-up unremarkable  - Symptoms improved with Adderall 10 mg once a day.  May have plateaued for which  we will increase to 20 mg.  -We discussed the potential side effects of long-term stimulant use which include high blood pressure, chest pain, palpitations, unintended weight loss due to poor appetite, insomnia, increased anxiety.  We should do periodic check ins to make sure that it is still safe to continue stimulant medication.  The goal is to control symptoms while minimizing side effects with the lowest effective dose and the shortest duration of time.     IBS like symptoms  Intermittent loose stools, alternating constipation.  Benign abdominal examination  - We will start a food log to determine any other triggering factors  - May benefit from the use of a FODMAP diet for the majority of the week  - Can consider probiotic  - Has tried Metamucil/MiraLAX which helps with constipation     Chronic urethral stricture  Status post urethroplasty on 12/7/2020.  Has since been following with his urologist Dr. Phelps.  -Urinary issues have resolved  -Continue following up with urology as needed.  Has clinically resolved          Orders This Visit:  No orders of the defined types were placed in this encounter.      Meds This Visit:  Requested Prescriptions      No prescriptions requested or ordered in this encounter       Imaging & Referrals:  None     Health Maintenance  HTN Screen: BP at goal  DM Screen: Check fasting glucose  HLD Screen: Check fasting lipid panel  HCV Screen: Considered low risk  HIV Screen: considered low risk  G/C/Syphilis: Considered low risk     Colon Cancer Screening (50-70): Not indicated  Prostate Cancer Screening: (50-70): Not indicated  Lung Cancer Screening (55-79 with 30 p/year and active < 15 years): Not indicated  AAA Screening (65-75 Hx of smoking): Not indicated     Influenza: Annually   Td/Tdap: Last Tdap - assess at next visit  Zoster (60+): Not indicated  HPV (19-26): Not indicated  Pneumococcal: Not indicated    Immunization History   Administered Date(s) Administered    Covid-19  Vaccine Pfizer 30 mcg/0.3 ml 02/12/2021, 03/08/2021, 10/17/2021    Covid-19 Vaccine Pfizer Bivalent 30mcg/0.3mL 10/11/2022    FLUAD High Dose 65 yr and older (89994) 01/23/2020    FLUZONE 6 months and older PFS 0.5 ml (78228) 10/01/2018, 10/03/2018, 01/23/2020, 10/17/2021    Flucelvax 0.5ml Vaccine 10/08/2020    Flucelvax Influenza vaccine, trivalent (ccIIV3), 0.5mL IM 10/08/2020, 10/11/2022    Influenza 10/08/2020    Influenza Vaccine, trivalent (IIV3), PF 0.5mL (54463) 10/09/2024    TDAP 07/13/2016       RTC 6 mo    Amilcar Nuñez MD, 02/07/25, 9:14 AM

## 2025-02-07 ENCOUNTER — OFFICE VISIT (OUTPATIENT)
Dept: INTERNAL MEDICINE CLINIC | Facility: CLINIC | Age: 40
End: 2025-02-07
Payer: COMMERCIAL

## 2025-02-07 VITALS
SYSTOLIC BLOOD PRESSURE: 112 MMHG | HEART RATE: 76 BPM | TEMPERATURE: 97 F | DIASTOLIC BLOOD PRESSURE: 62 MMHG | HEIGHT: 70.5 IN | WEIGHT: 169 LBS | BODY MASS INDEX: 23.93 KG/M2

## 2025-02-07 DIAGNOSIS — R41.840 CONCENTRATION DEFICIT: ICD-10-CM

## 2025-02-07 DIAGNOSIS — Z83.3 FAMILY HISTORY OF DIABETES MELLITUS: ICD-10-CM

## 2025-02-07 DIAGNOSIS — Z00.00 ANNUAL PHYSICAL EXAM: Primary | ICD-10-CM

## 2025-02-07 DIAGNOSIS — Z13.220 SCREENING FOR LIPOID DISORDERS: ICD-10-CM

## 2025-02-07 DIAGNOSIS — J30.9 CHRONIC ALLERGIC RHINITIS: ICD-10-CM

## 2025-02-07 DIAGNOSIS — Z13.1 SCREENING FOR DIABETES MELLITUS: ICD-10-CM

## 2025-02-07 DIAGNOSIS — Z13.29 SCREENING FOR THYROID DISORDER: ICD-10-CM

## 2025-02-07 PROCEDURE — 99396 PREV VISIT EST AGE 40-64: CPT | Performed by: INTERNAL MEDICINE

## 2025-02-07 RX ORDER — DEXTROAMPHETAMINE SACCHARATE, AMPHETAMINE ASPARTATE, DEXTROAMPHETAMINE SULFATE AND AMPHETAMINE SULFATE 5; 5; 5; 5 MG/1; MG/1; MG/1; MG/1
20 TABLET ORAL DAILY
Qty: 30 TABLET | Refills: 0 | Status: SHIPPED | OUTPATIENT
Start: 2025-02-07 | End: 2025-03-09

## 2025-02-07 RX ORDER — DEXTROAMPHETAMINE SACCHARATE, AMPHETAMINE ASPARTATE, DEXTROAMPHETAMINE SULFATE AND AMPHETAMINE SULFATE 5; 5; 5; 5 MG/1; MG/1; MG/1; MG/1
20 TABLET ORAL DAILY
Qty: 30 TABLET | Refills: 0 | Status: SHIPPED | OUTPATIENT
Start: 2025-04-10 | End: 2025-05-10

## 2025-02-07 RX ORDER — DEXTROAMPHETAMINE SACCHARATE, AMPHETAMINE ASPARTATE, DEXTROAMPHETAMINE SULFATE AND AMPHETAMINE SULFATE 5; 5; 5; 5 MG/1; MG/1; MG/1; MG/1
20 TABLET ORAL DAILY
Qty: 30 TABLET | Refills: 0 | Status: SHIPPED | OUTPATIENT
Start: 2025-03-10 | End: 2025-04-09

## 2025-08-18 DIAGNOSIS — F90.9 ATTENTION DEFICIT HYPERACTIVITY DISORDER (ADHD), UNSPECIFIED ADHD TYPE: ICD-10-CM

## 2025-08-18 RX ORDER — DEXTROAMPHETAMINE SACCHARATE, AMPHETAMINE ASPARTATE, DEXTROAMPHETAMINE SULFATE AND AMPHETAMINE SULFATE 5; 5; 5; 5 MG/1; MG/1; MG/1; MG/1
20 TABLET ORAL DAILY
Qty: 30 TABLET | Refills: 0 | Status: SHIPPED | OUTPATIENT
Start: 2025-08-18 | End: 2025-09-17